# Patient Record
Sex: FEMALE | Race: WHITE | NOT HISPANIC OR LATINO | Employment: FULL TIME | URBAN - METROPOLITAN AREA
[De-identification: names, ages, dates, MRNs, and addresses within clinical notes are randomized per-mention and may not be internally consistent; named-entity substitution may affect disease eponyms.]

---

## 2021-07-19 ENCOUNTER — TELEPHONE (OUTPATIENT)
Dept: SCHEDULING | Facility: IMAGING CENTER | Age: 31
End: 2021-07-19

## 2021-07-22 ENCOUNTER — OFFICE VISIT (OUTPATIENT)
Dept: MEDICAL GROUP | Facility: PHYSICIAN GROUP | Age: 31
End: 2021-07-22
Payer: COMMERCIAL

## 2021-07-22 VITALS
SYSTOLIC BLOOD PRESSURE: 100 MMHG | TEMPERATURE: 97.1 F | DIASTOLIC BLOOD PRESSURE: 68 MMHG | BODY MASS INDEX: 25.04 KG/M2 | HEART RATE: 75 BPM | RESPIRATION RATE: 12 BRPM | WEIGHT: 124.2 LBS | HEIGHT: 59 IN | OXYGEN SATURATION: 97 %

## 2021-07-22 DIAGNOSIS — E01.0 THYROMEGALY: ICD-10-CM

## 2021-07-22 DIAGNOSIS — J45.40 MODERATE PERSISTENT ASTHMA WITHOUT COMPLICATION: ICD-10-CM

## 2021-07-22 DIAGNOSIS — Z00.00 PHYSICAL EXAM, ANNUAL: ICD-10-CM

## 2021-07-22 PROCEDURE — 99204 OFFICE O/P NEW MOD 45 MIN: CPT | Performed by: PHYSICIAN ASSISTANT

## 2021-07-22 RX ORDER — ALBUTEROL SULFATE 90 UG/1
2 AEROSOL, METERED RESPIRATORY (INHALATION) EVERY 6 HOURS PRN
Qty: 1 EACH | Refills: 0 | Status: SHIPPED | OUTPATIENT
Start: 2021-07-22 | End: 2021-08-21

## 2021-07-22 RX ORDER — DEXAMETHASONE 4 MG/1
1 TABLET ORAL 2 TIMES DAILY
Qty: 1 EACH | Refills: 0 | Status: SHIPPED | OUTPATIENT
Start: 2021-07-22 | End: 2021-08-21

## 2021-07-22 ASSESSMENT — PATIENT HEALTH QUESTIONNAIRE - PHQ9: CLINICAL INTERPRETATION OF PHQ2 SCORE: 0

## 2021-07-30 ENCOUNTER — HOSPITAL ENCOUNTER (OUTPATIENT)
Dept: LAB | Facility: MEDICAL CENTER | Age: 31
End: 2021-07-30
Attending: PHYSICIAN ASSISTANT
Payer: COMMERCIAL

## 2021-07-30 DIAGNOSIS — Z00.00 PHYSICAL EXAM, ANNUAL: ICD-10-CM

## 2021-07-30 LAB
ALBUMIN SERPL BCP-MCNC: 4.9 G/DL (ref 3.2–4.9)
ALBUMIN/GLOB SERPL: 1.8 G/DL
ALP SERPL-CCNC: 47 U/L (ref 30–99)
ALT SERPL-CCNC: 13 U/L (ref 2–50)
ANION GAP SERPL CALC-SCNC: 10 MMOL/L (ref 7–16)
AST SERPL-CCNC: 17 U/L (ref 12–45)
BASOPHILS # BLD AUTO: 0.9 % (ref 0–1.8)
BASOPHILS # BLD: 0.06 K/UL (ref 0–0.12)
BILIRUB SERPL-MCNC: 0.4 MG/DL (ref 0.1–1.5)
BUN SERPL-MCNC: 14 MG/DL (ref 8–22)
CALCIUM SERPL-MCNC: 9.3 MG/DL (ref 8.5–10.5)
CHLORIDE SERPL-SCNC: 102 MMOL/L (ref 96–112)
CHOLEST SERPL-MCNC: 171 MG/DL (ref 100–199)
CO2 SERPL-SCNC: 24 MMOL/L (ref 20–33)
CREAT SERPL-MCNC: 0.65 MG/DL (ref 0.5–1.4)
EOSINOPHIL # BLD AUTO: 0.15 K/UL (ref 0–0.51)
EOSINOPHIL NFR BLD: 2.2 % (ref 0–6.9)
ERYTHROCYTE [DISTWIDTH] IN BLOOD BY AUTOMATED COUNT: 39.5 FL (ref 35.9–50)
EST. AVERAGE GLUCOSE BLD GHB EST-MCNC: 108 MG/DL
FASTING STATUS PATIENT QL REPORTED: NORMAL
GLOBULIN SER CALC-MCNC: 2.8 G/DL (ref 1.9–3.5)
GLUCOSE SERPL-MCNC: 87 MG/DL (ref 65–99)
HBA1C MFR BLD: 5.4 % (ref 4–5.6)
HCT VFR BLD AUTO: 42.6 % (ref 37–47)
HDLC SERPL-MCNC: 48 MG/DL
HGB BLD-MCNC: 14 G/DL (ref 12–16)
IMM GRANULOCYTES # BLD AUTO: 0.02 K/UL (ref 0–0.11)
IMM GRANULOCYTES NFR BLD AUTO: 0.3 % (ref 0–0.9)
LDLC SERPL CALC-MCNC: 106 MG/DL
LYMPHOCYTES # BLD AUTO: 3.06 K/UL (ref 1–4.8)
LYMPHOCYTES NFR BLD: 44 % (ref 22–41)
MCH RBC QN AUTO: 29.3 PG (ref 27–33)
MCHC RBC AUTO-ENTMCNC: 32.9 G/DL (ref 33.6–35)
MCV RBC AUTO: 89.1 FL (ref 81.4–97.8)
MONOCYTES # BLD AUTO: 0.3 K/UL (ref 0–0.85)
MONOCYTES NFR BLD AUTO: 4.3 % (ref 0–13.4)
NEUTROPHILS # BLD AUTO: 3.36 K/UL (ref 2–7.15)
NEUTROPHILS NFR BLD: 48.3 % (ref 44–72)
NRBC # BLD AUTO: 0 K/UL
NRBC BLD-RTO: 0 /100 WBC
PLATELET # BLD AUTO: 228 K/UL (ref 164–446)
PMV BLD AUTO: 10 FL (ref 9–12.9)
POTASSIUM SERPL-SCNC: 3.9 MMOL/L (ref 3.6–5.5)
PROT SERPL-MCNC: 7.7 G/DL (ref 6–8.2)
RBC # BLD AUTO: 4.78 M/UL (ref 4.2–5.4)
SODIUM SERPL-SCNC: 136 MMOL/L (ref 135–145)
TRIGL SERPL-MCNC: 85 MG/DL (ref 0–149)
TSH SERPL DL<=0.005 MIU/L-ACNC: 1.24 UIU/ML (ref 0.38–5.33)
WBC # BLD AUTO: 7 K/UL (ref 4.8–10.8)

## 2021-07-30 PROCEDURE — 80061 LIPID PANEL: CPT

## 2021-07-30 PROCEDURE — 83036 HEMOGLOBIN GLYCOSYLATED A1C: CPT

## 2021-07-30 PROCEDURE — 36415 COLL VENOUS BLD VENIPUNCTURE: CPT

## 2021-07-30 PROCEDURE — 80053 COMPREHEN METABOLIC PANEL: CPT

## 2021-07-30 PROCEDURE — 84443 ASSAY THYROID STIM HORMONE: CPT

## 2021-07-30 PROCEDURE — 85025 COMPLETE CBC W/AUTO DIFF WBC: CPT

## 2021-08-02 ENCOUNTER — APPOINTMENT (OUTPATIENT)
Dept: URGENT CARE | Facility: CLINIC | Age: 31
End: 2021-08-02
Payer: COMMERCIAL

## 2021-08-02 ENCOUNTER — OFFICE VISIT (OUTPATIENT)
Dept: MEDICAL GROUP | Facility: PHYSICIAN GROUP | Age: 31
End: 2021-08-02
Payer: COMMERCIAL

## 2021-08-02 ENCOUNTER — APPOINTMENT (OUTPATIENT)
Dept: RADIOLOGY | Facility: IMAGING CENTER | Age: 31
End: 2021-08-02
Attending: PHYSICIAN ASSISTANT
Payer: COMMERCIAL

## 2021-08-02 VITALS
RESPIRATION RATE: 12 BRPM | TEMPERATURE: 97.8 F | SYSTOLIC BLOOD PRESSURE: 110 MMHG | DIASTOLIC BLOOD PRESSURE: 78 MMHG | HEART RATE: 100 BPM | BODY MASS INDEX: 26.33 KG/M2 | HEIGHT: 59 IN | WEIGHT: 130.6 LBS | OXYGEN SATURATION: 98 %

## 2021-08-02 DIAGNOSIS — M54.2 NECK PAIN, ACUTE: ICD-10-CM

## 2021-08-02 PROCEDURE — 99213 OFFICE O/P EST LOW 20 MIN: CPT | Performed by: PHYSICIAN ASSISTANT

## 2021-08-02 PROCEDURE — 72040 X-RAY EXAM NECK SPINE 2-3 VW: CPT | Mod: TC | Performed by: PHYSICIAN ASSISTANT

## 2021-08-02 RX ORDER — METHOCARBAMOL 750 MG/1
750 TABLET, FILM COATED ORAL 4 TIMES DAILY
Qty: 40 TABLET | Refills: 0 | Status: SHIPPED | OUTPATIENT
Start: 2021-08-02 | End: 2021-08-12

## 2021-08-02 ASSESSMENT — FIBROSIS 4 INDEX: FIB4 SCORE: 0.64

## 2021-08-02 NOTE — PROGRESS NOTES
CC:  Chief Complaint   Patient presents with   • Neck Pain     injured 3 yrs ago, cant move neck        HISTORY OF PRESENT ILLNESS: Patient is a 31 y.o. female established patient presenting with pain in her neck that started 3 days ago when she was doing push ups. Has hx of injury to her neck 3 years ago when she was doing sit ups with fingers interlocked behind her head. Did not have imaging done. Was prescribed tylenol. Since then she has had repeated neck injuries that have been quite painful. Injuries occurring when she is doing an exercise with her [employer] unit. States that they are occurring similar to today about once per month and pain eventually subsides over the course of a week. Pain located midline over her C5-C6 region and extends up her neck.       No problem-specific Assessment & Plan notes found for this encounter.      There are no problems to display for this patient.     Allergies:Patient has no known allergies.    Current Outpatient Medications   Medication Sig Dispense Refill   • fluticasone (FLOVENT HFA) 110 MCG/ACT Aerosol Inhale 1 Puff 2 times a day for 30 days. 1 Each 0   • albuterol 108 (90 Base) MCG/ACT Aero Soln inhalation aerosol Inhale 2 Puffs every 6 hours as needed for Shortness of Breath for up to 30 days. 1 Each 0     No current facility-administered medications for this visit.       Social History     Tobacco Use   • Smoking status: Never Smoker   • Smokeless tobacco: Never Used   Vaping Use   • Vaping Use: Never used   Substance Use Topics   • Alcohol use: Yes     Comment: rarely   • Drug use: Never     Social History     Social History Narrative   • Not on file       Family History   Problem Relation Age of Onset   • Heart Disease Father         ROS:     - Constitutional:  Negative for fever, chills, unexpected weight change, and fatigue/generalized weakness.    - HEENT: Positive for neck pain.  Negative for headaches, vision changes, hearing changes, ear pain, ear discharge,  "rhinorrhea, sinus congestion, sore throat,     - Musculoskeletal:Positive for neck pain.  Negative for myalgias,  and joint pain.     - Skin: Negative for rash, itching, cyanotic skin color change.     - Neurological: Negative for dizziness, tingling, tremors, focal sensory deficit, focal weakness and headaches.         Exam:    /78   Pulse 100   Temp 36.6 °C (97.8 °F) (Temporal)   Resp 12   Ht 1.499 m (4' 11\")   Wt 59.2 kg (130 lb 9.6 oz)   SpO2 98%  Body mass index is 26.38 kg/m².    General:  Appears uncomfortable  Head is grossly normal.  Neck: Very reduced cervical ROM with flexion, extension, and rotation. Thyroid is not enlarged.  Skin: Warm and dry. No obvious lesions  Neuro: Normal muscle tone. Gait normal. Alert and oriented.  Psych: Normal mood and affect      Please note that this dictation was created using voice recognition software. I have made every reasonable attempt to correct obvious errors, but I expect that there are errors of grammar and possibly content that I did not discover before finalizing the note.        Assessment/Plan:  1. Neck pain, acute  I explained to her that her repeated neck injuries will likely cause permanent damage over time. She is to stop doing any physical activities that aggrevates her neck. I am going to get xray and MRI and will review results with her when available.   - DX-CERVICAL SPINE-2 OR 3 VIEWS; Future  - MR-CERVICAL SPINE-W/O; Future  - methocarbamol (ROBAXIN-750) 750 MG Tab; Take 1 tablet by mouth 4 times a day for 10 days.  Dispense: 40 tablet; Refill: 0           "

## 2021-08-19 ENCOUNTER — HOSPITAL ENCOUNTER (OUTPATIENT)
Dept: RADIOLOGY | Facility: MEDICAL CENTER | Age: 31
End: 2021-08-19
Attending: PHYSICIAN ASSISTANT
Payer: COMMERCIAL

## 2021-08-19 DIAGNOSIS — E01.0 THYROMEGALY: ICD-10-CM

## 2021-08-19 PROCEDURE — 76536 US EXAM OF HEAD AND NECK: CPT

## 2022-03-28 ENCOUNTER — OFFICE VISIT (OUTPATIENT)
Dept: URGENT CARE | Facility: CLINIC | Age: 32
End: 2022-03-28
Payer: COMMERCIAL

## 2022-03-28 ENCOUNTER — APPOINTMENT (OUTPATIENT)
Dept: RADIOLOGY | Facility: IMAGING CENTER | Age: 32
End: 2022-03-28
Attending: PHYSICIAN ASSISTANT
Payer: COMMERCIAL

## 2022-03-28 VITALS
BODY MASS INDEX: 26.35 KG/M2 | HEIGHT: 59 IN | DIASTOLIC BLOOD PRESSURE: 64 MMHG | TEMPERATURE: 97.9 F | RESPIRATION RATE: 12 BRPM | OXYGEN SATURATION: 98 % | WEIGHT: 130.7 LBS | HEART RATE: 74 BPM | SYSTOLIC BLOOD PRESSURE: 102 MMHG

## 2022-03-28 DIAGNOSIS — M79.671 RIGHT FOOT PAIN: ICD-10-CM

## 2022-03-28 DIAGNOSIS — S96.911A STRAIN OF RIGHT FOOT, INITIAL ENCOUNTER: ICD-10-CM

## 2022-03-28 PROCEDURE — 99213 OFFICE O/P EST LOW 20 MIN: CPT | Performed by: PHYSICIAN ASSISTANT

## 2022-03-28 PROCEDURE — 73630 X-RAY EXAM OF FOOT: CPT | Mod: TC,RT | Performed by: RADIOLOGY

## 2022-03-28 ASSESSMENT — FIBROSIS 4 INDEX: FIB4 SCORE: 0.66

## 2022-03-28 ASSESSMENT — ENCOUNTER SYMPTOMS
SENSORY CHANGE: 0
NAUSEA: 0
VOMITING: 0
CHILLS: 0
TINGLING: 0
FEVER: 0

## 2022-03-28 NOTE — LETTER
March 28, 2022         Patient: Payton Tang   YOB: 1990   Date of Visit: 3/28/2022           To Whom it May Concern:    Payton Tang was seen in my clinic on 3/28/2022.  Please excuse her from any strenuous physical activity, specifically running, until she follows up with orthopedics.    If you have any questions or concerns, please don't hesitate to call.        Sincerely,           Lynette Hernandez P.A.-C.  Electronically Signed

## 2022-03-28 NOTE — PROGRESS NOTES
"Subjective     Payton Tang is a 32 y.o. female who presents with Foot Pain ((R) heel sharp pain x 3/24; working out ( climbing ladders) when pain occurred)    HPI:  Payton Tang is a 32 y.o. female who presents today for evaluation of right foot pain.  Patient reports that she started to feel pain on the lateral aspect of her right foot/heel approximately 4 days ago.  She first noticed it while she was climbing ladders but denies any specific injury.  Has never hurt that foot before.  Has sprained the ankle in the past.  She has been applying heat to the area and taking ibuprofen which is providing almost no relief.  She denies any numbness or tingling.      Review of Systems   Constitutional: Negative for chills and fever.   Gastrointestinal: Negative for nausea and vomiting.   Musculoskeletal:        Right foot pain   Neurological: Negative for tingling and sensory change.         PMH:  has a past medical history of Allergy and Asthma.  MEDS: No current outpatient medications on file.  ALLERGIES:   Allergies   Allergen Reactions   • Seasonal      SURGHX:   Past Surgical History:   Procedure Laterality Date   • PRIMARY C SECTION      2     SOCHX:  reports that she has never smoked. She has never used smokeless tobacco. She reports current alcohol use. She reports that she does not use drugs.  FH: Family history was reviewed, no pertinent findings to report      Objective     /64 (BP Location: Right arm, Patient Position: Sitting)   Pulse 74   Temp 36.6 °C (97.9 °F) (Temporal)   Resp 12   Ht 1.499 m (4' 11\")   Wt 59.3 kg (130 lb 11.2 oz)   SpO2 98%   BMI 26.40 kg/m²      Physical Exam  Constitutional:       Appearance: She is well-developed.   HENT:      Head: Normocephalic and atraumatic.      Right Ear: External ear normal.      Left Ear: External ear normal.   Eyes:      Conjunctiva/sclera: Conjunctivae normal.      Pupils: Pupils are equal, round, and reactive to light. "   Cardiovascular:      Rate and Rhythm: Normal rate.   Pulmonary:      Effort: Pulmonary effort is normal.   Musculoskeletal:      Right foot: Normal range of motion. Swelling, tenderness and bony tenderness present.      Comments: Right foot exhibits tenderness over base of the fifth metatarsal and mild surrounding tenderness.  There is some localized soft tissue swelling in that area.  No overlying erythema or ecchymosis.  DP pulses are 2+ and symmetric bilaterally.  Distal neurovascular status intact.  Cap refill of all toes is less than 2 seconds.  No tenderness at the ankle.  No decreased range of motion of the foot or ankle.  Mildly antalgic gait.   Skin:     General: Skin is warm and dry.      Capillary Refill: Capillary refill takes less than 2 seconds.   Neurological:      Mental Status: She is alert and oriented to person, place, and time.   Psychiatric:         Behavior: Behavior normal.         Judgment: Judgment normal.          DX-FOOT-COMPLETE 3+ RIGHT  FINDINGS:  No focal soft tissue swelling.  No fracture or dislocation.  No retained opaque foreign body.     IMPRESSION:  Unremarkable RIGHT foot.      Assessment & Plan     1. Strain of right foot, initial encounter  - Referral to Orthopedics    2. Right foot pain  - DX-FOOT-COMPLETE 3+ RIGHT; Future  - Referral to Orthopedics        No evidence of acute fracture or dislocation.  There is some soft tissue swelling noted on exam as well as some point tenderness over the fifth metatarsal.  Cannot definitively rule out ligamentous injury or stress fracture.  Patient was placed in a walking boot was recommended that she try to limit her weightbearing activities.  She should take Aleve twice daily and recommend that she apply ice to the area multiple times per day.  Follow-up with orthopedics for more definitive management.            Differential Diagnosis, natural history, and supportive care discussed. Return to the Urgent Care or follow up with your  PCP if symptoms fail to resolve, or for any new or worsening symptoms. Emergency room precautions discussed. Patient and/or family appears understanding of information.

## 2023-06-15 ENCOUNTER — OFFICE VISIT (OUTPATIENT)
Dept: URGENT CARE | Facility: PHYSICIAN GROUP | Age: 33
End: 2023-06-15
Payer: OTHER GOVERNMENT

## 2023-06-15 ENCOUNTER — HOSPITAL ENCOUNTER (OUTPATIENT)
Dept: RADIOLOGY | Facility: MEDICAL CENTER | Age: 33
End: 2023-06-15
Attending: STUDENT IN AN ORGANIZED HEALTH CARE EDUCATION/TRAINING PROGRAM
Payer: OTHER GOVERNMENT

## 2023-06-15 VITALS
DIASTOLIC BLOOD PRESSURE: 76 MMHG | OXYGEN SATURATION: 98 % | HEIGHT: 59 IN | RESPIRATION RATE: 16 BRPM | BODY MASS INDEX: 26.21 KG/M2 | SYSTOLIC BLOOD PRESSURE: 118 MMHG | WEIGHT: 130 LBS | HEART RATE: 88 BPM | TEMPERATURE: 97.7 F

## 2023-06-15 DIAGNOSIS — S49.91XA INJURY OF RIGHT SHOULDER, INITIAL ENCOUNTER: ICD-10-CM

## 2023-06-15 DIAGNOSIS — S46.001A ROTATOR CUFF INJURY, RIGHT, INITIAL ENCOUNTER: ICD-10-CM

## 2023-06-15 PROCEDURE — 3078F DIAST BP <80 MM HG: CPT | Performed by: STUDENT IN AN ORGANIZED HEALTH CARE EDUCATION/TRAINING PROGRAM

## 2023-06-15 PROCEDURE — 3074F SYST BP LT 130 MM HG: CPT | Performed by: STUDENT IN AN ORGANIZED HEALTH CARE EDUCATION/TRAINING PROGRAM

## 2023-06-15 PROCEDURE — 73030 X-RAY EXAM OF SHOULDER: CPT | Mod: RT

## 2023-06-15 PROCEDURE — 99214 OFFICE O/P EST MOD 30 MIN: CPT | Performed by: STUDENT IN AN ORGANIZED HEALTH CARE EDUCATION/TRAINING PROGRAM

## 2023-06-15 RX ORDER — ALBUTEROL SULFATE 90 UG/1
AEROSOL, METERED RESPIRATORY (INHALATION)
COMMUNITY
Start: 2023-04-04

## 2023-06-15 ASSESSMENT — FIBROSIS 4 INDEX: FIB4 SCORE: 0.68

## 2023-06-16 NOTE — PROGRESS NOTES
"Subjective:   Payton Tang is a 33 y.o. female who presents for Shoulder Injury (Right ,lifting and moving equipment @1:00 today  )      HPI:  Pleasant 33-year-old female who is active [employer] presents to the urgent care today after sustaining an injury to her right shoulder at approximately 1 PM today.  She states that she was lifting heavy objects and operating a crank with her right arm when she started experiencing pain in her shoulder.  States that the pain is mainly located to the front of her shoulder.  She does report reduced range of motion due to pain.  She denies any past trauma to the right shoulder.  No history of surgeries.  Denies numbness, tingling, burning, radiation of pain down the arm, falls, loss of consciousness, neck pain, fever, chills, shortness of breath, or chest pain.    Medications:    albuterol Aers    Allergies: Seasonal    Problem List: Payton Tang does not have a problem list on file.    Surgical History:  Past Surgical History:   Procedure Laterality Date    PRIMARY C SECTION      2       Past Social Hx: Payton Tang  reports that she has never smoked. She has never used smokeless tobacco. She reports current alcohol use. She reports that she does not use drugs.     Past Family Hx:  Payton Tang family history includes Heart Disease in her father.     Problem list, medications, and allergies reviewed by myself today in Epic.     Objective:     /76   Pulse 88   Temp 36.5 °C (97.7 °F) (Temporal)   Resp 16   Ht 1.499 m (4' 11\")   Wt 59 kg (130 lb)   SpO2 98%   BMI 26.26 kg/m²     Physical Exam  Vitals reviewed.   Constitutional:       General: She is not in acute distress.     Appearance: Normal appearance.   HENT:      Head: Normocephalic.   Cardiovascular:      Rate and Rhythm: Normal rate.      Pulses: Normal pulses.   Pulmonary:      Effort: Pulmonary effort is normal.   Musculoskeletal:      Cervical back: Normal range of motion. " No tenderness.      Comments: Right shoulder: No swelling, deformity, erythema, ecchymosis, or crepitus.  Tenderness present to the anterior shoulder and over the AC joint.  Reduced range of motion with forward shoulder flexion to approximately 80 degrees.  Unable to perform cross body test.  Unable to perform empty can test due to limited forward shoulder flexion.  4-5 strength during external rotation and forward shoulder flexion.  Sensation intact and cap refill less than 2 seconds.  2+ radial pulse.  Limited passive range of motion due to pain.   Neurological:      Mental Status: She is alert.         RADIOLOGY RESULTS   DX-SHOULDER 2+ RIGHT    Result Date: 6/15/2023  6/15/2023 4:23 PM HISTORY/REASON FOR EXAM:  Pain following trauma. TECHNIQUE/EXAM DESCRIPTION AND NUMBER OF VIEWS:  3 views of the RIGHT shoulder. COMPARISON: None FINDINGS: BONE MINERALIZATION: Normal. JOINTS: Preserved. No erosions. FRACTURE: None. DISLOCATION: None. SOFT TISSUES: No mass.     No acute osseous abnormality.           Assessment/Plan:     Diagnosis and associated orders:     1. Rotator cuff injury, right, initial encounter  DX-SHOULDER 2+ RIGHT    Referral to Sports Medicine         Comments/MDM:     X-ray shows no acute osseous abnormality.  I do suspect right rotator cuff strain versus tear.  Advised ibuprofen 600 mg every 6 hours.  May also supplement with 1 g Tylenol.  Ice 3-5 times daily for 20 minutes at a time.  Discussed early range of motion and stretching as tolerated.  Discussed frozen shoulder and ways to avoid this.  She was given a sling but only advised to use this for very short periods of time throughout the day as this can increase the chance of frozen shoulder.  Referral to sports medicine for further evaluation and management.  Patient is agreeable this.  She is neurovascular intact.  Vitals are stable.  ED/return precautions given.  Patient brought in  paperwork that was filled out and returned to her  at the end of her visit.         Differential diagnosis, natural history, supportive care, and indications for immediate follow-up discussed.    Advised the patient to follow-up with the primary care physician for recheck, reevaluation, and consideration of further management.    Please note that this dictation was created using voice recognition software. I have made a reasonable attempt to correct obvious errors, but I expect that there are errors of grammar and possibly content that I did not discover before finalizing the note.    Electronically signed by Jayy Clemente PA-C.

## 2023-06-30 ENCOUNTER — OFFICE VISIT (OUTPATIENT)
Dept: SPORTS MEDICINE | Facility: CLINIC | Age: 33
End: 2023-06-30
Payer: OTHER GOVERNMENT

## 2023-06-30 VITALS
BODY MASS INDEX: 26.21 KG/M2 | HEIGHT: 59 IN | TEMPERATURE: 98.6 F | RESPIRATION RATE: 16 BRPM | WEIGHT: 130 LBS | DIASTOLIC BLOOD PRESSURE: 70 MMHG | HEART RATE: 80 BPM | OXYGEN SATURATION: 97 % | SYSTOLIC BLOOD PRESSURE: 116 MMHG

## 2023-06-30 DIAGNOSIS — R29.898 WEAKNESS OF SHOULDER: ICD-10-CM

## 2023-06-30 DIAGNOSIS — S46.011A TRAUMATIC INCOMPLETE TEAR OF RIGHT ROTATOR CUFF, INITIAL ENCOUNTER: ICD-10-CM

## 2023-06-30 PROCEDURE — 76882 US LMTD JT/FCL EVL NVASC XTR: CPT | Performed by: FAMILY MEDICINE

## 2023-06-30 PROCEDURE — 3078F DIAST BP <80 MM HG: CPT | Performed by: FAMILY MEDICINE

## 2023-06-30 PROCEDURE — 99213 OFFICE O/P EST LOW 20 MIN: CPT | Performed by: FAMILY MEDICINE

## 2023-06-30 PROCEDURE — 3074F SYST BP LT 130 MM HG: CPT | Performed by: FAMILY MEDICINE

## 2023-06-30 ASSESSMENT — FIBROSIS 4 INDEX: FIB4 SCORE: 0.68

## 2023-06-30 NOTE — Clinical Note
Emmett Hope, Thank you for referring Maureen to the sports medicine clinic. Interesting case.  She has pretty significant weakness of the rotator cuff.  I suspect she may have a partial tear of the supraspinatus.  I did order an MRI and we will plan on seeing her back afterwards to discuss further management options. Hope you are well! Respectfully,  HALLIE Carreno M.D. [facility] [place] [phone]

## 2023-06-30 NOTE — PROGRESS NOTES
"Chief Complaint   Patient presents with    Shoulder Pain     Referral from UC/ R shoulder pain      CHIEF COMPLAINT:  Payton Tang female presenting at the request of Jayy Clemente P.A.-C.   for evaluation of Shoulder pain.     Payton Tang is complaining of right shoulder pain  Date of injury, Megan 15, 2023  Mechanism, she states that she was lifting heavy objects and operating a crank with her right arm when she started experiencing pain in her shoulder while performing [employer] duty  Felt sharp pain immediately after she had been cranking all morning  Pain is at the anterior and deltoid region  Quality is aching, sharp,  worse with lifting, pain tends to be anterior, arm/shoulder feels \"tired\"  Pain is Non-radiating  Aggravated by movement, particularly with abduction  Improved with  rest, but sling immobilization actually made her symptoms WORSE  no prior problems with this shoulder in the past  Prior Treatments:  Seen in urgent care  Prior studies: X-Ray   Medications tried for pain include: acetaminophen helps a bit temporarily  Mechanical Symptom history: No Locking and Popping which is new since the injury and can feel aggravating and discomforting    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 No pertinent past medical history.    PMH:  has a past medical history of Allergy and Asthma.  MEDS:   Current Outpatient Medications:     albuterol 108 (90 Base) MCG/ACT Aero Soln inhalation aerosol, INHALE 1 PUFF BY MOUTH EVERY 6 HOURS AS NEEDED FOR WHEEZING., Disp: , Rfl:   ALLERGIES:   Allergies   Allergen Reactions    Seasonal      SURGHX:   Past Surgical History:   Procedure Laterality Date    PRIMARY C SECTION      2     SOCHX:  reports that she has never smoked. She has never used smokeless tobacco. " "She reports current alcohol use. She reports that she does not use drugs.  FH: Family history was reviewed, no pertinent findings to report     PHYSICAL EXAM:  /70 (BP Location: Left arm, Patient Position: Sitting, BP Cuff Size: Adult)   Pulse 80   Temp 37 °C (98.6 °F) (Temporal)   Resp 16   Ht 1.499 m (4' 11\")   Wt 59 kg (130 lb)   SpO2 97%   BMI 26.26 kg/m²      well-developed, well-nourished in no apparent distress, alert and oriented x 3.  Gait: normal    Cervical spine:  Range of motion Slightly limited with Extension and Slightly limited with Lateral rotation particularly to the RIGHT  Spurling's testing is POSITIVE with pain radiating into the shoulder and akua-scapular region on the RIGHT  Cervical spine tenderness POSITIVE at the level of C5    Strength testing:     Deltoid, bilateral 5/5  Bicep, bilateral 5/5  Tricep, bilateral 5/5  Wrist Extension, bilateral 5/5  Wrist Flexion, bilateral 5/5  Finger Abduction, bilateral 5/5    Sensation:  INTACT Bilaterally    Reflexes:   Biceps: R 2+/L 2+  Triceps: R 2+/L  2+  Brachial radialis R 2+/L  2+  Crook's testing is NEGATIVE  The arms are otherwise neurovascularly intact     Shoulder Exam:    RIGHT Shoulder:  No visible swelling   Range of motion MARKEDLY DIMINISHED with pain, particularly with abduction  Tenderness: Supraspinatous tenderness  Empty Can Testing 0/5 with trouble lifting against gravity  Internal Rotation 5/5  External Rotation 3/5  Lift Off Testing 4/5  Impingement testing Duncan  POSITIVE  Neer's testing POSITIVE  Apprehension testing POSITIVE    LEFT Shoulder:  No visible swelling   Range of motion INTACT  Tenderness: Non-tender  Empty Can Testing 5/5  Internal Rotation 5/5  External Rotation 5/5  Lift Off Testing 5/5  Impingement testing Duncan  NEGATIVE  Neer's testing NEGATIVE  Apprehension testing NEGATIVE    Additional Findings: Flexed Posture    1. Traumatic incomplete tear of right rotator cuff, initial encounter  " MR-SHOULDER-W/O RIGHT      2. Weakness of shoulder (supraspinatus)  MR-SHOULDER-W/O RIGHT        Date of injury, Megan 15, 2023  Mechanism, she states that she was lifting heavy objects and operating a crank with her right arm when she started experiencing pain in her shoulder while performing [employer] duty  Felt sharp pain immediately after she had been cranking all morning    Given her weakness, limited musculoskeletal ultrasound evaluation of the RIGHT shoulder was performed the office TODAY (June 30, 2023)  There is a hypoechoic region within the supraspinatus articular side which seems to be consistent with a partial tear of the rotator cuff    Follow-up after MRI to discuss results and further management options        Supraspinatus demonstrates hypoechoic region at the articular surface of the insertion point  Initially, suspected this might be anisotropy, but various angles demonstrated continued hypoechoic signal        6/15/2023 4:23 PM     HISTORY/REASON FOR EXAM:  Pain following trauma.     TECHNIQUE/EXAM DESCRIPTION AND NUMBER OF VIEWS:  3 views of the RIGHT shoulder.     COMPARISON: None     FINDINGS:     BONE MINERALIZATION: Normal.  JOINTS: Preserved. No erosions.  FRACTURE: None.  DISLOCATION: None.  SOFT TISSUES: No mass.     IMPRESSION:     No acute osseous abnormality.           Exam Ended: 06/15/23  4:43 PM Last Resulted: 06/15/23  4:59 PM           Interpreted in the office today with the patient    Thank you Jayy Clemente P.A.-C.  For allowing me to participate in caring for your patient.

## 2023-06-30 NOTE — PROCEDURES
Procedures    RIGHT shoulder LIMITED musculoskeletal ultrasound evaluation  There is a hypoechoic region noted within the supraspinatus at the articular surface consistent with partial rotator cuff tear

## 2023-06-30 NOTE — Clinical Note
Emmett Caputo, I saw Payton in the sports medicine clinic today for shoulder pain.  She had been doing a lot of heavy labor putting up equipment while on [employer] duty.  She had been cranking all morning and experienced pain in the afternoon. She has pretty significant weakness of the shoulder and cannot abduct the shoulder beyond 90 degrees suggesting a rotator cuff tear (particularly supraspinatus).  We performed an ultrasound in the office which confirms abnormal signal at the supraspinatus.  I ordered an MRI, but since she has , it may need to come from her PCP. We may need your assistance if the order is rejected. Thanks in advance! Respectfully,  HALLIE Carreno M.D. [facility] [place] [phone]

## 2023-07-13 ENCOUNTER — APPOINTMENT (OUTPATIENT)
Dept: RADIOLOGY | Facility: MEDICAL CENTER | Age: 33
End: 2023-07-13
Attending: FAMILY MEDICINE
Payer: OTHER GOVERNMENT

## 2023-07-13 DIAGNOSIS — R29.898 WEAKNESS OF SHOULDER: ICD-10-CM

## 2023-07-13 DIAGNOSIS — S46.011A TRAUMATIC INCOMPLETE TEAR OF RIGHT ROTATOR CUFF, INITIAL ENCOUNTER: ICD-10-CM

## 2023-07-13 PROCEDURE — 73221 MRI JOINT UPR EXTREM W/O DYE: CPT | Mod: RT

## 2023-07-14 ENCOUNTER — OFFICE VISIT (OUTPATIENT)
Dept: SPORTS MEDICINE | Facility: CLINIC | Age: 33
End: 2023-07-14
Payer: OTHER GOVERNMENT

## 2023-07-14 VITALS — HEIGHT: 59 IN | WEIGHT: 130 LBS | BODY MASS INDEX: 26.21 KG/M2

## 2023-07-14 DIAGNOSIS — S46.911D RIGHT SHOULDER STRAIN, SUBSEQUENT ENCOUNTER: ICD-10-CM

## 2023-07-14 DIAGNOSIS — R29.898 SHOULDER WEAKNESS: ICD-10-CM

## 2023-07-14 PROCEDURE — 99213 OFFICE O/P EST LOW 20 MIN: CPT | Performed by: FAMILY MEDICINE

## 2023-07-14 ASSESSMENT — FIBROSIS 4 INDEX: FIB4 SCORE: 0.68

## 2023-07-14 NOTE — PROGRESS NOTES
Chief Complaint   Patient presents with    Shoulder Injury     F/V R shoulder injury       Pain persists  Here for MRI results    1. Right shoulder strain, subsequent encounter  Referral to Physical Therapy    Referral to Physical Therapy      2. Shoulder weakness  Referral to Physical Therapy    Referral to Physical Therapy        Referred for formal physical therapy in [place]    We will see how she does with home exercise program and physical therapy    Return in about 2 weeks (around 7/28/2023).  If she does not respond or symptoms continue over the next few weeks, consider subacromial corticosteroid injection of the RIGHT shoulder                7/13/2023 6:28 PM     HISTORY/REASON FOR EXAM:  Right-sided shoulder pain.     TECHNIQUE/EXAM DESCRIPTION:  MRI of the RIGHT shoulder without contrast.     Using a Siemens Skyra 3.0 Hina MRI scanner, T1 sagittal, fast spin-echo T2 fat-suppressed oblique coronal, sagittal, and axial and intermediate fast spin-echo oblique coronal images were obtained.     COMPARISON: None.     FINDINGS:     Osseous acromial outlet: The acromion demonstrates a curved undersurface. There is mild lateral downsloping.  There is no evidence of an inferiorly directed subacromial enthesophyte.  There is no evidence of degenerative change of the acromioclavicular joint. There are no inferiorly directed osteophytes.  There is no fluid seen within the subacromial/subdeltoid bursa.     Rotator cuff: Intact.  No evidence of tear.     Glenoid labrum: The superior glenoid labrum is intact. The anterior/inferior and posterior/inferior glenoid labrum are intact.     Osseous structures/Cartilaginous surfaces: No evidence of marrow edema.  Cartilaginous surfaces are well maintained.        Miscellaneous: No significant joint effusion. The long head of the biceps tendon is appropriately situated within the bicipital groove.     IMPRESSION:     1.  Intact rotator cuff.     2.  Intact glenoid labrum.     3.   No evidence of osseous or cartilaginous injury.           Exam Ended: 07/13/23  6:50 PM Last Resulted: 07/14/23  7:53 AM           Interpreted in the office today with the patient

## 2023-08-04 ENCOUNTER — OFFICE VISIT (OUTPATIENT)
Dept: MEDICAL GROUP | Facility: PHYSICIAN GROUP | Age: 33
End: 2023-08-04
Payer: OTHER GOVERNMENT

## 2023-08-04 VITALS
WEIGHT: 130 LBS | HEIGHT: 59 IN | BODY MASS INDEX: 26.21 KG/M2 | OXYGEN SATURATION: 100 % | DIASTOLIC BLOOD PRESSURE: 68 MMHG | SYSTOLIC BLOOD PRESSURE: 102 MMHG | TEMPERATURE: 98.9 F | HEART RATE: 69 BPM

## 2023-08-04 DIAGNOSIS — Z00.00 ROUTINE HEALTH MAINTENANCE: ICD-10-CM

## 2023-08-04 DIAGNOSIS — Z11.59 NEED FOR HEPATITIS C SCREENING TEST: ICD-10-CM

## 2023-08-04 DIAGNOSIS — M54.2 CHRONIC NECK PAIN: ICD-10-CM

## 2023-08-04 DIAGNOSIS — G89.29 CHRONIC NECK PAIN: ICD-10-CM

## 2023-08-04 DIAGNOSIS — E78.00 ELEVATED LDL CHOLESTEROL LEVEL: ICD-10-CM

## 2023-08-04 DIAGNOSIS — J30.2 SEASONAL ALLERGIES: ICD-10-CM

## 2023-08-04 DIAGNOSIS — Z83.49 FAMILY HISTORY OF THYROID DISEASE IN MOTHER: ICD-10-CM

## 2023-08-04 DIAGNOSIS — Z76.89 ENCOUNTER TO ESTABLISH CARE: ICD-10-CM

## 2023-08-04 DIAGNOSIS — G89.29 CHRONIC RIGHT SHOULDER PAIN: ICD-10-CM

## 2023-08-04 DIAGNOSIS — J45.990 EXERCISE-INDUCED ASTHMA: ICD-10-CM

## 2023-08-04 DIAGNOSIS — Z97.5 IUD (INTRAUTERINE DEVICE) IN PLACE: ICD-10-CM

## 2023-08-04 DIAGNOSIS — M25.511 CHRONIC RIGHT SHOULDER PAIN: ICD-10-CM

## 2023-08-04 PROCEDURE — 3074F SYST BP LT 130 MM HG: CPT | Performed by: STUDENT IN AN ORGANIZED HEALTH CARE EDUCATION/TRAINING PROGRAM

## 2023-08-04 PROCEDURE — 99214 OFFICE O/P EST MOD 30 MIN: CPT | Performed by: STUDENT IN AN ORGANIZED HEALTH CARE EDUCATION/TRAINING PROGRAM

## 2023-08-04 PROCEDURE — 3078F DIAST BP <80 MM HG: CPT | Performed by: STUDENT IN AN ORGANIZED HEALTH CARE EDUCATION/TRAINING PROGRAM

## 2023-08-04 RX ORDER — EPINEPHRINE 0.3 MG/.3ML
0.3 INJECTION SUBCUTANEOUS ONCE
COMMUNITY

## 2023-08-04 RX ORDER — FEXOFENADINE HCL 180 MG/1
180 TABLET ORAL DAILY
COMMUNITY

## 2023-08-04 RX ORDER — IPRATROPIUM BROMIDE 42 UG/1
2 SPRAY, METERED NASAL 4 TIMES DAILY
COMMUNITY

## 2023-08-04 RX ORDER — NAPROXEN 500 MG/1
500 TABLET ORAL 2 TIMES DAILY WITH MEALS
COMMUNITY

## 2023-08-04 RX ORDER — LEVOCETIRIZINE DIHYDROCHLORIDE 5 MG/1
TABLET, FILM COATED ORAL
COMMUNITY

## 2023-08-04 RX ORDER — FLUTICASONE PROPIONATE 50 MCG
1 SPRAY, SUSPENSION (ML) NASAL DAILY
COMMUNITY

## 2023-08-04 RX ORDER — METHOCARBAMOL 500 MG/1
1000 TABLET, FILM COATED ORAL 4 TIMES DAILY
COMMUNITY

## 2023-08-04 NOTE — PROGRESS NOTES
Subjective:     CC:  establish care    HISTORY OF THE PRESENT ILLNESS: Patient is a 33 y.o. female here today to establish care. Prior PCP was Harshal Tripathi.    Exercise-induced asthma  Patient uses albuterol prior to exercises but complains of SOB in the middle of doing cardio.    Seasonal allergies  Patient reports no improvement with levocetirizine, cetirizine, fexofenadine, fluticasone nasal spray, ipratropium nasal spray.  Patient occasionally uses albuterol inhaler.  Patient reports history of frequent sinus infections.  Patient is requesting referral to Allergist.    Chronic neck pain  Onset in 2017.  Patient was in training for the [employer] and onset when she was doing a sit up.  Patient described it as sharp, pinching pain.  Patient reports she can no longer do sit-up.  [facility] and naproxen but reports no improvement.  Patient tried massage therapy and orthopedic pillow but no improvement.    Chronic right shoulder pain  Patient complains of chronic right shoulder pain that was recently exacerbated.  Follows up with sports medicine and PT ordered.  X-ray and MRI were unremarkable.      Health Maintenance: Completed  - [facility]    Allergies   Allergen Reactions    Peg-8 Beeswax [Polyethylene Glycol]     Seasonal      Patient Active Problem List   Diagnosis    Chronic right shoulder pain    Seasonal allergies    Exercise-induced asthma    Chronic neck pain    IUD (intrauterine device) in place     Current Outpatient Medications   Medication Sig Dispense Refill    EPINEPHrine (EPIPEN) 0.3 MG/0.3ML Solution Auto-injector solution for injection Inject 0.3 mg into the shoulder, thigh, or buttocks one time.      methocarbamol (ROBAXIN) 500 MG Tab Take 1,000 mg by mouth 4 times a day.      naproxen (NAPROSYN) 500 MG Tab Take 500 mg by mouth 2 times a day with meals.      fluticasone (FLONASE) 50 MCG/ACT nasal spray Administer 1 Spray into  affected nostril(S) every day.      Levocetirizine Dihydrochloride 5 MG Tab Take  by mouth.      Cetirizine HCl 10 MG Cap Take  by mouth.      ipratropium (ATROVENT) 0.06 % Solution Administer 2 Sprays into affected nostril(S) 4 times a day.      fexofenadine (KP FEXOFENADINE HCL) 180 MG tablet Take 180 mg by mouth every day.      albuterol 108 (90 Base) MCG/ACT Aero Soln inhalation aerosol INHALE 1 PUFF BY MOUTH EVERY 6 HOURS AS NEEDED FOR WHEEZING.       No current facility-administered medications for this visit.     Past Surgical History:   Procedure Laterality Date    PRIMARY C SECTION      2      Social History     Socioeconomic History    Marital status:      Spouse name: Not on file    Number of children: 2    Years of education: Not on file    Highest education level: Not on file   Occupational History     Employer: [employer]   Tobacco Use    Smoking status: Never    Smokeless tobacco: Never   Vaping Use    Vaping Use: Never used   Substance and Sexual Activity    Alcohol use: Yes     Alcohol/week: 0.6 - 1.2 oz     Types: 1 - 2 Shots of liquor per week     Comment: rarely    Drug use: Never    Sexual activity: Yes     Partners: Male     Birth control/protection: I.U.D.     Comment: , partner of 1 year, iud placed 2019   Other Topics Concern    Not on file   Social History Narrative    Lives with partner of 1 year and children 50% of the time.     Social Determinants of Health     Financial Resource Strain: Not on file   Food Insecurity: Not on file   Transportation Needs: Not on file   Physical Activity: Not on file   Stress: Not on file   Social Connections: Not on file   Intimate Partner Violence: Not on file   Housing Stability: Not on file     Family History   Problem Relation Age of Onset    Diabetes Mother     Heart Disease Mother         recently had stents and needs bypass    Peripheral vascular disease Mother     Thyroid Mother     Heart Disease Father           "of MI    Kidney Disease Maternal Aunt         dialysis    Cancer Neg Hx     Ovarian Cancer Neg Hx     Tubal Cancer Neg Hx     Peritoneal Cancer Neg Hx     Breast Cancer Neg Hx     Colorectal Cancer Neg Hx          ROS:     Constitutional:  Negative for chills, fever, fatigue, weight loss.  HEENT:  Positive for sinus pressure and nasal congestion but negative for blurred vision, hearing loss, sore throat.    Respiratory:  Negative for cough, sputum production and shortness of breath.  Cardiovascular:  Negative for chest pain, palpitations and leg swelling.  Gastrointestinal:  Negative for abdominal pain, blood in stool, constipation, diarrhea and vomiting.   Musculoskeletal: Positive for right shoulder and neck pain but negative for back pain and falls.   Skin:  Negative for rash.   Neurological:  Negative for dizziness, seizures, weakness and headaches.   Endo/Heme/Allergies:  Does not bruise/bleed easily.   Psychiatric/Behavioral:  Negative for depression, anxiety and suicidal thoughts.      Objective:     Exam: /68 (BP Location: Left arm, Patient Position: Sitting, BP Cuff Size: Adult)   Pulse 69   Temp 37.2 °C (98.9 °F) (Temporal)   Ht 1.499 m (4' 11\")   Wt 59 kg (130 lb)   SpO2 100%  Body mass index is 26.26 kg/m².    Gen: Alert and oriented, no acute distress.  Eyes:  PERRL, conjunctivae clear, lids normal.   ENMT: Lips without lesions, good dentition, moist mucous membranes.  Tympanic membranes translucent with good landmarks today bilaterally.  No oropharyngeal erythema or exudate.  Neck: Neck is supple, trachea middle, no thyromegaly.  Lungs: Normal effort, CTAB, no wheezing / rhonchi / rales.  CV: RRR, normal S1 and S2, no murmurs.  GI:  Abdomen soft, non-tender, non-distended with normal bowel sounds.  Ext: No clubbing, cyanosis, or edema.  Skin:  Warm and dry with no rashes or lesions.  Neuro: AAO x 3, no acute focal deficits.  Psych: Normal affect and mood.      Assessment & Plan:   33 y.o. " female with the following -    1. Encounter to establish care  2. Routine health maintenance  Patient presents today to establish care.  Chart was reviewed and history was discussed in detail with the patient.  Previous labs reviewed and annual labs ordered.  Patient [facility] and she will return next year for Pap smear.  UTD with vaccines.  - CBC WITH DIFFERENTIAL; Future  - Comp Metabolic Panel; Future    3. Chronic right shoulder pain  Chronic, uncontrolled.  Follows up with sports medicine.  Given phone # for PT and patient will schedule appointment.    4. Seasonal allergies  Chronic, uncontrolled.  Patient has tried several OTC antihistamines and nasal sprays but reports no improvement.  Patient also occasionally uses albuterol inhaler.  Given referral to allergist.  - Referral to Allergy    5. Exercise-induced asthma  Chronic, controlled.  Continue albuterol as needed.    6. Chronic neck pain  Chronic, uncontrolled.  No improvement with methocarbamol or naproxen.  Patient was advised to speak with Dr. Carreno.    7. IUD (intrauterine device) in place  Patient thinks she may have Kyleena and it was inserted in March or April 2019.  Patient will go home and check her card.  Patient will return next year for IUD with Danae if due.    8. Elevated LDL cholesterol level  Chronic, uncontrolled.  July 2021 LDL was slightly elevated at 106.  Repeat lipid panel ordered.  - Lipid Profile; Future    9. Family history of thyroid disease in mother  - TSH WITH REFLEX TO FT4; Future    10. Need for hepatitis C screening test  - HEP C VIRUS ANTIBODY; Future          I spent a total of 35 minutes with record review, exam, communication with the patient, communication with other providers, and documentation of this encounter.    Return in about 1 year (around 8/4/2024) for Annual preventive visit, Gyn exam.    Please note that this dictation was created using voice recognition software. I have  made every reasonable attempt to correct obvious errors, but I expect that there are errors of grammar and possibly content that I did not discover before finalizing the note.

## 2023-08-04 NOTE — ASSESSMENT & PLAN NOTE
Patient reports no improvement with levocetirizine, cetirizine, fexofenadine, fluticasone nasal spray, ipratropium nasal spray.  Patient occasionally uses albuterol inhaler.  Patient reports history of frequent sinus infections.  Patient is requesting referral to Allergist.

## 2023-08-04 NOTE — ASSESSMENT & PLAN NOTE
Patient complains of chronic right shoulder pain that was recently exacerbated.  Follows up with sports medicine and PT ordered.  X-ray and MRI were unremarkable.

## 2023-08-04 NOTE — ASSESSMENT & PLAN NOTE
Onset in 2017.  Patient was in training for the [employer] and onset when she was doing a sit up.  Patient described it as sharp, pinching pain.  Patient reports she can no longer do sit-up.  [facility] given [facility]ocarbamol and naproxen but reports no improvement.  Patient tried massage therapy and orthopedic pillow but no improvement.

## 2024-06-21 NOTE — PROGRESS NOTES
Advance Care Planning     Advance Care Planning opens a door to talk about and write down your wishes before a sudden accident or illness.  Make your goals, values, and preferences known.     This puts you in the ’s seat and helps others know what matters most to you so they won’t have to guess.      Where can you learn more?    Go to [email or web address]   to learn how to:    Name someone you trust to make healthcare decisions for you, only if you can’t. (Healthcare Power of )    Document your wishes for care if you were seriously ill and not expected to recover or are approaching end of life. (Advance Directive or Living Will)    The same page can be found using the QR code below.                Starting a Weight Loss Plan: Care Instructions  Overview     It can be a challenge to lose weight. But your doctor can help you make a weight-loss plan that meets your needs.  You don't have to make a lot of big changes at once. A better idea might be to focus on small changes and stick with them. When those changes become habit, you can add a few more changes.  Some people find it helpful to take an exercise or nutrition class. If you have questions, ask your doctor about seeing a registered dietitian or an exercise specialist. You might also think about joining a weight-loss support group.  If you're not ready to make changes right now, try to pick a date in the future. Then make an appointment with your doctor to talk about when and how you'll get started with a plan.  Follow-up care is a key part of your treatment and safety. Be sure to make and go to all appointments, and call your doctor if you are having problems. It's also a good idea to know your test results and keep a list of the medicines you take.  How can you care for yourself at home?  Set realistic goals. Many people expect to lose much more weight than is likely. A weight loss of 5% to 10% of your body  CC:    Chief Complaint   Patient presents with   • Establish Care       HISTORY OF THE PRESENT ILLNESS: Patient is a 31 y.o. female presenting to [facility] primary care     1. Pt taking OTC primatene mist inhaler for her asthma. Since moving to [facility] she has been more reliant on it. Wondering if there is something more that she can take. Has been using her inhaler twice a day.     No problem-specific Assessment & Plan notes found for this encounter.    Allergies: Patient has no known allergies.    Current Outpatient Medications Ordered in Epic   Medication Sig Dispense Refill   • ALBUTEROL INH Inhale.     • EPINEPHrine (PRIMATENE MIST INH) Inhale.       No current [facility]-ordered facility-administered medications on file.       Past Medical History:   Diagnosis Date   • Allergy    • Asthma        Past Surgical History:   Procedure Laterality Date   • PRIMARY C SECTION      2       Social History     Tobacco Use   • Smoking status: Never Smoker   • Smokeless tobacco: Never Used   Vaping Use   • Vaping Use: Never used   Substance Use Topics   • Alcohol use: Yes     Comment: rarely   • Drug use: Never       Social History     Social History Narrative   • Not on file       No family history on file.    ROS:     - Constitutional: Negative for fever, chills, unexpected weight change, and fatigue/generalized weakness.     - HEENT: Negative for headaches, vision changes, hearing changes, ear pain, ear discharge, rhinorrhea, sinus congestion, sore throat, and neck pain.      - Respiratory: Positive for SOB.  Negative for cough, sputum production, chest congestion, dyspnea, wheezing, and crackles.      - Cardiovascular: Negative for chest pain, palpitations, orthopnea, and bilateral lower extremity edema.     - Gastrointestinal: Negative for heartburn, nausea, vomiting, abdominal pain, hematochezia, melena, diarrhea, constipation, and greasy/foul-smelling stools.     - Genitourinary: Negative for dysuria, polyuria,  "hematuria, pyuria, urinary urgency, and urinary incontinence.     - Musculoskeletal: Negative for myalgias, back pain, and joint pain.     - Skin: Negative for rash, itching, cyanotic skin color change.     - Neurological:Positive for occasional headaches.  Negative for dizziness, tingling, tremors, focal sensory deficit, focal weakness and headaches.     - Endo/Heme/Allergies: Does not bruise/bleed easily.     - Psychiatric/Behavioral: Negative for depression, suicidal/homicidal ideation and memory loss.            .      Exam: /68   Pulse 75   Temp 36.2 °C (97.1 °F) (Temporal)   Resp 12   Ht 1.499 m (4' 11\")   Wt 56.3 kg (124 lb 3.2 oz)   SpO2 97%  Body mass index is 25.09 kg/m².    General: Normal appearing. No acute distress.  Skin: Warm and dry.  No obvious lesions.  HEENT: Normocephalic. Eyes conjunctiva clear lids without ptosis, ears normal shape and contour. No lymphadenopathy. Positive for mild thyromegaly.   Cardiovascular: Regular rate and rhythm without murmur.   Respiratory: Clear to auscultation bilaterally, no rhonchi wheezing or rales.  Neurologic: Grossly nonfocal, A&O x3, gait normal,  Musculoskeletal: No deformity or swelling.   Extremities: No extremity cyanosis, clubbing, or edema.  Psych: Normal mood and affect. Judgment and insight is normal.    Please note that this dictation was created using voice recognition software. I have made every reasonable attempt to correct obvious errors, but I expect that there are errors of grammar and possibly content that I did not discover before finalizing the note.      Assessment/Plan  1. Physical exam, annual  PE conducted. Will check labs and review at next visit  - CBC WITH DIFFERENTIAL; Future  - Comp Metabolic Panel; Future  - Lipid Profile; Future  - HEMOGLOBIN A1C; Future  - TSH WITH REFLEX TO FT4; Future    2. Thyromegaly  Will get US and review at next visit  - US-THYROID; Future    3. Moderate persistent asthma without " complication  Will trial on flovent and albuterol and review at next visit.   - fluticasone (FLOVENT HFA) 110 MCG/ACT Aerosol; Inhale 1 Puff 2 times a day for 30 days.  Dispense: 1 Each; Refill: 0  - albuterol 108 (90 Base) MCG/ACT Aero Soln inhalation aerosol; Inhale 2 Puffs every 6 hours as needed for Shortness of Breath for up to 30 days.  Dispense: 1 Each; Refill: 0

## 2024-10-09 ENCOUNTER — HOSPITAL ENCOUNTER (OUTPATIENT)
Facility: MEDICAL CENTER | Age: 34
End: 2024-10-09
Attending: STUDENT IN AN ORGANIZED HEALTH CARE EDUCATION/TRAINING PROGRAM
Payer: OTHER GOVERNMENT

## 2024-10-09 ENCOUNTER — APPOINTMENT (OUTPATIENT)
Dept: MEDICAL GROUP | Facility: PHYSICIAN GROUP | Age: 34
End: 2024-10-09
Payer: OTHER GOVERNMENT

## 2024-10-09 VITALS
BODY MASS INDEX: 26.23 KG/M2 | WEIGHT: 130.1 LBS | TEMPERATURE: 97 F | HEART RATE: 99 BPM | OXYGEN SATURATION: 100 % | SYSTOLIC BLOOD PRESSURE: 102 MMHG | DIASTOLIC BLOOD PRESSURE: 56 MMHG | HEIGHT: 59 IN

## 2024-10-09 DIAGNOSIS — M25.511 CHRONIC RIGHT SHOULDER PAIN: ICD-10-CM

## 2024-10-09 DIAGNOSIS — G89.29 CHRONIC RIGHT SHOULDER PAIN: ICD-10-CM

## 2024-10-09 DIAGNOSIS — Z01.419 WELL WOMAN EXAM: ICD-10-CM

## 2024-10-09 DIAGNOSIS — Z97.5 IUD (INTRAUTERINE DEVICE) IN PLACE: ICD-10-CM

## 2024-10-09 DIAGNOSIS — J45.990 EXERCISE-INDUCED ASTHMA: ICD-10-CM

## 2024-10-09 DIAGNOSIS — M54.2 CHRONIC NECK PAIN: ICD-10-CM

## 2024-10-09 DIAGNOSIS — J30.2 SEASONAL ALLERGIES: ICD-10-CM

## 2024-10-09 DIAGNOSIS — E78.00 ELEVATED LDL CHOLESTEROL LEVEL: ICD-10-CM

## 2024-10-09 DIAGNOSIS — G89.29 CHRONIC NECK PAIN: ICD-10-CM

## 2024-10-09 DIAGNOSIS — Z91.030 BEE STING ALLERGY: ICD-10-CM

## 2024-10-09 DIAGNOSIS — Z23 NEED FOR VACCINATION: ICD-10-CM

## 2024-10-09 DIAGNOSIS — N64.4 BREAST PAIN, LEFT: ICD-10-CM

## 2024-10-09 PROCEDURE — 90656 IIV3 VACC NO PRSV 0.5 ML IM: CPT | Performed by: STUDENT IN AN ORGANIZED HEALTH CARE EDUCATION/TRAINING PROGRAM

## 2024-10-09 PROCEDURE — 3074F SYST BP LT 130 MM HG: CPT | Performed by: STUDENT IN AN ORGANIZED HEALTH CARE EDUCATION/TRAINING PROGRAM

## 2024-10-09 PROCEDURE — 87624 HPV HI-RISK TYP POOLED RSLT: CPT

## 2024-10-09 PROCEDURE — 99459 PELVIC EXAMINATION: CPT | Mod: 25 | Performed by: STUDENT IN AN ORGANIZED HEALTH CARE EDUCATION/TRAINING PROGRAM

## 2024-10-09 PROCEDURE — 99395 PREV VISIT EST AGE 18-39: CPT | Mod: 25 | Performed by: STUDENT IN AN ORGANIZED HEALTH CARE EDUCATION/TRAINING PROGRAM

## 2024-10-09 PROCEDURE — 90471 IMMUNIZATION ADMIN: CPT | Performed by: STUDENT IN AN ORGANIZED HEALTH CARE EDUCATION/TRAINING PROGRAM

## 2024-10-09 PROCEDURE — 99000 SPECIMEN HANDLING OFFICE-LAB: CPT | Performed by: STUDENT IN AN ORGANIZED HEALTH CARE EDUCATION/TRAINING PROGRAM

## 2024-10-09 PROCEDURE — 88175 CYTOPATH C/V AUTO FLUID REDO: CPT

## 2024-10-09 PROCEDURE — 99214 OFFICE O/P EST MOD 30 MIN: CPT | Mod: 25 | Performed by: STUDENT IN AN ORGANIZED HEALTH CARE EDUCATION/TRAINING PROGRAM

## 2024-10-09 PROCEDURE — 3078F DIAST BP <80 MM HG: CPT | Performed by: STUDENT IN AN ORGANIZED HEALTH CARE EDUCATION/TRAINING PROGRAM

## 2024-10-09 RX ORDER — EPINEPHRINE 0.3 MG/.3ML
0.3 INJECTION SUBCUTANEOUS ONCE
Qty: 2 EACH | Refills: 1 | Status: SHIPPED | OUTPATIENT
Start: 2024-10-09 | End: 2024-10-09

## 2024-10-09 SDOH — ECONOMIC STABILITY: FOOD INSECURITY: WITHIN THE PAST 12 MONTHS, THE FOOD YOU BOUGHT JUST DIDN'T LAST AND YOU DIDN'T HAVE MONEY TO GET MORE.: NEVER TRUE

## 2024-10-09 SDOH — ECONOMIC STABILITY: INCOME INSECURITY: HOW HARD IS IT FOR YOU TO PAY FOR THE VERY BASICS LIKE FOOD, HOUSING, MEDICAL CARE, AND HEATING?: NOT HARD AT ALL

## 2024-10-09 SDOH — ECONOMIC STABILITY: FOOD INSECURITY: WITHIN THE PAST 12 MONTHS, YOU WORRIED THAT YOUR FOOD WOULD RUN OUT BEFORE YOU GOT MONEY TO BUY MORE.: NEVER TRUE

## 2024-10-09 SDOH — ECONOMIC STABILITY: TRANSPORTATION INSECURITY
IN THE PAST 12 MONTHS, HAS THE LACK OF TRANSPORTATION KEPT YOU FROM MEDICAL APPOINTMENTS OR FROM GETTING MEDICATIONS?: NO

## 2024-10-09 SDOH — ECONOMIC STABILITY: HOUSING INSECURITY
IN THE LAST 12 MONTHS, WAS THERE A TIME WHEN YOU DID NOT HAVE A STEADY PLACE TO SLEEP OR SLEPT IN A SHELTER (INCLUDING NOW)?: NO

## 2024-10-09 SDOH — ECONOMIC STABILITY: TRANSPORTATION INSECURITY
IN THE PAST 12 MONTHS, HAS LACK OF RELIABLE TRANSPORTATION KEPT YOU FROM MEDICAL APPOINTMENTS, MEETINGS, WORK OR FROM GETTING THINGS NEEDED FOR DAILY LIVING?: NO

## 2024-10-09 SDOH — HEALTH STABILITY: PHYSICAL HEALTH: ON AVERAGE, HOW MANY DAYS PER WEEK DO YOU ENGAGE IN MODERATE TO STRENUOUS EXERCISE (LIKE A BRISK WALK)?: 5 DAYS

## 2024-10-09 SDOH — HEALTH STABILITY: PHYSICAL HEALTH: ON AVERAGE, HOW MANY MINUTES DO YOU ENGAGE IN EXERCISE AT THIS LEVEL?: 60 MIN

## 2024-10-09 SDOH — HEALTH STABILITY: MENTAL HEALTH
STRESS IS WHEN SOMEONE FEELS TENSE, NERVOUS, ANXIOUS, OR CAN'T SLEEP AT NIGHT BECAUSE THEIR MIND IS TROUBLED. HOW STRESSED ARE YOU?: NOT AT ALL

## 2024-10-09 SDOH — ECONOMIC STABILITY: INCOME INSECURITY: IN THE LAST 12 MONTHS, WAS THERE A TIME WHEN YOU WERE NOT ABLE TO PAY THE MORTGAGE OR RENT ON TIME?: NO

## 2024-10-09 SDOH — ECONOMIC STABILITY: TRANSPORTATION INSECURITY
IN THE PAST 12 MONTHS, HAS LACK OF TRANSPORTATION KEPT YOU FROM MEETINGS, WORK, OR FROM GETTING THINGS NEEDED FOR DAILY LIVING?: NO

## 2024-10-09 ASSESSMENT — SOCIAL DETERMINANTS OF HEALTH (SDOH)
WITHIN THE PAST 12 MONTHS, YOU WORRIED THAT YOUR FOOD WOULD RUN OUT BEFORE YOU GOT THE MONEY TO BUY MORE: NEVER TRUE
IN THE PAST 12 MONTHS, HAS THE ELECTRIC, GAS, OIL, OR WATER COMPANY THREATENED TO SHUT OFF SERVICE IN YOUR HOME?: NO
HOW OFTEN DO YOU ATTENT MEETINGS OF THE CLUB OR ORGANIZATION YOU BELONG TO?: MORE THAN 4 TIMES PER YEAR
DO YOU BELONG TO ANY CLUBS OR ORGANIZATIONS SUCH AS CHURCH GROUPS UNIONS, FRATERNAL OR ATHLETIC GROUPS, OR SCHOOL GROUPS?: YES
HOW OFTEN DO YOU GET TOGETHER WITH FRIENDS OR RELATIVES?: ONCE A WEEK
HOW OFTEN DO YOU ATTENT MEETINGS OF THE CLUB OR ORGANIZATION YOU BELONG TO?: MORE THAN 4 TIMES PER YEAR
ARE YOU MARRIED, WIDOWED, DIVORCED, SEPARATED, NEVER MARRIED, OR LIVING WITH A PARTNER?: LIVING WITH PARTNER
IN A TYPICAL WEEK, HOW MANY TIMES DO YOU TALK ON THE PHONE WITH FAMILY, FRIENDS, OR NEIGHBORS?: TWICE A WEEK
HOW HARD IS IT FOR YOU TO PAY FOR THE VERY BASICS LIKE FOOD, HOUSING, MEDICAL CARE, AND HEATING?: NOT HARD AT ALL
DO YOU BELONG TO ANY CLUBS OR ORGANIZATIONS SUCH AS CHURCH GROUPS UNIONS, FRATERNAL OR ATHLETIC GROUPS, OR SCHOOL GROUPS?: YES
HOW OFTEN DO YOU GET TOGETHER WITH FRIENDS OR RELATIVES?: ONCE A WEEK
HOW OFTEN DO YOU ATTEND CHURCH OR RELIGIOUS SERVICES?: NEVER
HOW OFTEN DO YOU HAVE A DRINK CONTAINING ALCOHOL: MONTHLY OR LESS
ARE YOU MARRIED, WIDOWED, DIVORCED, SEPARATED, NEVER MARRIED, OR LIVING WITH A PARTNER?: LIVING WITH PARTNER
IN A TYPICAL WEEK, HOW MANY TIMES DO YOU TALK ON THE PHONE WITH FAMILY, FRIENDS, OR NEIGHBORS?: TWICE A WEEK
HOW OFTEN DO YOU ATTEND CHURCH OR RELIGIOUS SERVICES?: NEVER
HOW MANY DRINKS CONTAINING ALCOHOL DO YOU HAVE ON A TYPICAL DAY WHEN YOU ARE DRINKING: 1 OR 2
HOW OFTEN DO YOU HAVE SIX OR MORE DRINKS ON ONE OCCASION: NEVER

## 2024-10-09 ASSESSMENT — LIFESTYLE VARIABLES
HOW OFTEN DO YOU HAVE A DRINK CONTAINING ALCOHOL: MONTHLY OR LESS
HOW OFTEN DO YOU HAVE SIX OR MORE DRINKS ON ONE OCCASION: NEVER
HOW MANY STANDARD DRINKS CONTAINING ALCOHOL DO YOU HAVE ON A TYPICAL DAY: 1 OR 2
AUDIT-C TOTAL SCORE: 1
SKIP TO QUESTIONS 9-10: 1

## 2024-10-09 ASSESSMENT — PATIENT HEALTH QUESTIONNAIRE - PHQ9: CLINICAL INTERPRETATION OF PHQ2 SCORE: 0

## 2024-10-14 LAB
CYTOLOGIST CVX/VAG CYTO: NORMAL
CYTOLOGY CVX/VAG DOC CYTO: NORMAL
CYTOLOGY CVX/VAG DOC THIN PREP: NORMAL
HPV I/H RISK 4 DNA CVX QL PROBE+SIG AMP: NEGATIVE
NOTE NL11727A: NORMAL
OTHER STN SPEC: NORMAL
QC REVIEWED BY NL11722A: NORMAL
STAT OF ADQ CVX/VAG CYTO-IMP: NORMAL

## 2024-10-21 ENCOUNTER — OFFICE VISIT (OUTPATIENT)
Dept: PHYSICAL MEDICINE AND REHAB | Facility: MEDICAL CENTER | Age: 34
End: 2024-10-21
Payer: OTHER GOVERNMENT

## 2024-10-21 VITALS
HEART RATE: 74 BPM | BODY MASS INDEX: 26.29 KG/M2 | TEMPERATURE: 97.2 F | DIASTOLIC BLOOD PRESSURE: 78 MMHG | SYSTOLIC BLOOD PRESSURE: 110 MMHG | WEIGHT: 130.4 LBS | HEIGHT: 59 IN | OXYGEN SATURATION: 98 %

## 2024-10-21 DIAGNOSIS — M79.18 MYALGIA OF MUSCLE OF NECK: ICD-10-CM

## 2024-10-21 DIAGNOSIS — M79.18 CERVICAL MYOFASCIAL PAIN SYNDROME: ICD-10-CM

## 2024-10-21 DIAGNOSIS — M54.2 CHRONIC NECK PAIN: ICD-10-CM

## 2024-10-21 DIAGNOSIS — G89.29 CHRONIC NECK PAIN: ICD-10-CM

## 2024-10-21 PROCEDURE — 3074F SYST BP LT 130 MM HG: CPT | Performed by: STUDENT IN AN ORGANIZED HEALTH CARE EDUCATION/TRAINING PROGRAM

## 2024-10-21 PROCEDURE — 20553 NJX 1/MLT TRIGGER POINTS 3/>: CPT | Performed by: STUDENT IN AN ORGANIZED HEALTH CARE EDUCATION/TRAINING PROGRAM

## 2024-10-21 PROCEDURE — 1125F AMNT PAIN NOTED PAIN PRSNT: CPT | Performed by: STUDENT IN AN ORGANIZED HEALTH CARE EDUCATION/TRAINING PROGRAM

## 2024-10-21 PROCEDURE — 99204 OFFICE O/P NEW MOD 45 MIN: CPT | Mod: 25 | Performed by: STUDENT IN AN ORGANIZED HEALTH CARE EDUCATION/TRAINING PROGRAM

## 2024-10-21 PROCEDURE — 3078F DIAST BP <80 MM HG: CPT | Performed by: STUDENT IN AN ORGANIZED HEALTH CARE EDUCATION/TRAINING PROGRAM

## 2024-10-21 PROCEDURE — 76942 ECHO GUIDE FOR BIOPSY: CPT | Performed by: STUDENT IN AN ORGANIZED HEALTH CARE EDUCATION/TRAINING PROGRAM

## 2024-10-21 RX ADMIN — Medication 10 ML: at 10:04

## 2024-10-21 ASSESSMENT — PATIENT HEALTH QUESTIONNAIRE - PHQ9: CLINICAL INTERPRETATION OF PHQ2 SCORE: 0

## 2024-10-21 ASSESSMENT — PAIN SCALES - GENERAL: PAINLEVEL: 6=MODERATE PAIN

## 2024-10-24 ENCOUNTER — HOSPITAL ENCOUNTER (OUTPATIENT)
Dept: RADIOLOGY | Facility: MEDICAL CENTER | Age: 34
End: 2024-10-24
Attending: STUDENT IN AN ORGANIZED HEALTH CARE EDUCATION/TRAINING PROGRAM
Payer: OTHER GOVERNMENT

## 2024-10-24 DIAGNOSIS — N64.4 BREAST PAIN, LEFT: ICD-10-CM

## 2024-10-24 PROCEDURE — G0279 TOMOSYNTHESIS, MAMMO: HCPCS

## 2024-10-24 PROCEDURE — 76642 ULTRASOUND BREAST LIMITED: CPT | Mod: LT

## 2024-11-14 ENCOUNTER — HOSPITAL ENCOUNTER (OUTPATIENT)
Dept: RADIOLOGY | Facility: MEDICAL CENTER | Age: 34
End: 2024-11-14
Attending: STUDENT IN AN ORGANIZED HEALTH CARE EDUCATION/TRAINING PROGRAM
Payer: OTHER GOVERNMENT

## 2024-11-14 DIAGNOSIS — M79.18 MYALGIA OF MUSCLE OF NECK: ICD-10-CM

## 2024-11-14 DIAGNOSIS — M79.18 CERVICAL MYOFASCIAL PAIN SYNDROME: ICD-10-CM

## 2024-11-14 DIAGNOSIS — G89.29 CHRONIC NECK PAIN: ICD-10-CM

## 2024-11-14 DIAGNOSIS — M54.2 CHRONIC NECK PAIN: ICD-10-CM

## 2024-11-14 PROCEDURE — 72040 X-RAY EXAM NECK SPINE 2-3 VW: CPT

## 2024-12-16 ENCOUNTER — APPOINTMENT (OUTPATIENT)
Dept: PHYSICAL MEDICINE AND REHAB | Facility: MEDICAL CENTER | Age: 34
End: 2024-12-16
Payer: OTHER GOVERNMENT

## 2025-02-24 ENCOUNTER — APPOINTMENT (OUTPATIENT)
Dept: PHYSICAL THERAPY | Facility: REHABILITATION | Age: 35
End: 2025-02-24
Attending: STUDENT IN AN ORGANIZED HEALTH CARE EDUCATION/TRAINING PROGRAM
Payer: OTHER GOVERNMENT

## 2025-03-10 ENCOUNTER — APPOINTMENT (OUTPATIENT)
Dept: PHYSICAL THERAPY | Facility: REHABILITATION | Age: 35
End: 2025-03-10
Attending: STUDENT IN AN ORGANIZED HEALTH CARE EDUCATION/TRAINING PROGRAM
Payer: OTHER GOVERNMENT

## 2025-03-24 ENCOUNTER — APPOINTMENT (OUTPATIENT)
Dept: PHYSICAL THERAPY | Facility: REHABILITATION | Age: 35
End: 2025-03-24
Attending: STUDENT IN AN ORGANIZED HEALTH CARE EDUCATION/TRAINING PROGRAM
Payer: OTHER GOVERNMENT

## 2025-04-07 ENCOUNTER — APPOINTMENT (OUTPATIENT)
Dept: PHYSICAL THERAPY | Facility: REHABILITATION | Age: 35
End: 2025-04-07
Attending: STUDENT IN AN ORGANIZED HEALTH CARE EDUCATION/TRAINING PROGRAM
Payer: OTHER GOVERNMENT

## 2025-04-21 ENCOUNTER — APPOINTMENT (OUTPATIENT)
Dept: PHYSICAL THERAPY | Facility: REHABILITATION | Age: 35
End: 2025-04-21
Attending: STUDENT IN AN ORGANIZED HEALTH CARE EDUCATION/TRAINING PROGRAM
Payer: OTHER GOVERNMENT

## 2025-05-12 ENCOUNTER — APPOINTMENT (OUTPATIENT)
Dept: MEDICAL GROUP | Facility: PHYSICIAN GROUP | Age: 35
End: 2025-05-12
Payer: OTHER GOVERNMENT

## 2025-05-12 VITALS
BODY MASS INDEX: 26.49 KG/M2 | HEART RATE: 80 BPM | HEIGHT: 59 IN | WEIGHT: 131.4 LBS | DIASTOLIC BLOOD PRESSURE: 64 MMHG | OXYGEN SATURATION: 100 % | SYSTOLIC BLOOD PRESSURE: 98 MMHG | TEMPERATURE: 97.9 F

## 2025-05-12 DIAGNOSIS — Z00.00 ROUTINE HEALTH MAINTENANCE: ICD-10-CM

## 2025-05-12 DIAGNOSIS — B35.3 TINEA PEDIS OF LEFT FOOT: ICD-10-CM

## 2025-05-12 DIAGNOSIS — F98.8 ATTENTION DEFICIT DISORDER, UNSPECIFIED TYPE: ICD-10-CM

## 2025-05-12 DIAGNOSIS — E78.00 ELEVATED LDL CHOLESTEROL LEVEL: ICD-10-CM

## 2025-05-12 PROCEDURE — 3078F DIAST BP <80 MM HG: CPT | Performed by: STUDENT IN AN ORGANIZED HEALTH CARE EDUCATION/TRAINING PROGRAM

## 2025-05-12 PROCEDURE — 99214 OFFICE O/P EST MOD 30 MIN: CPT | Performed by: STUDENT IN AN ORGANIZED HEALTH CARE EDUCATION/TRAINING PROGRAM

## 2025-05-12 PROCEDURE — 3074F SYST BP LT 130 MM HG: CPT | Performed by: STUDENT IN AN ORGANIZED HEALTH CARE EDUCATION/TRAINING PROGRAM

## 2025-05-12 RX ORDER — FLUCONAZOLE 150 MG/1
150 TABLET ORAL
Qty: 4 TABLET | Refills: 0 | Status: SHIPPED | OUTPATIENT
Start: 2025-05-12 | End: 2025-06-03

## 2025-05-12 RX ORDER — DEXTROAMPHETAMINE SACCHARATE, AMPHETAMINE ASPARTATE, DEXTROAMPHETAMINE SULFATE AND AMPHETAMINE SULFATE 1.25; 1.25; 1.25; 1.25 MG/1; MG/1; MG/1; MG/1
5 TABLET ORAL DAILY
Qty: 30 TABLET | Refills: 0 | Status: SHIPPED | OUTPATIENT
Start: 2025-05-12 | End: 2025-06-11

## 2025-05-12 NOTE — PROGRESS NOTES
"Subjective:     Chief Complaint   Patient presents with    Referral Needed     [facility]hd evaluation ([facility])     Tinea Pedis     L foot using otc not working        History of Present Illness  The patient presents for evaluation of ADHD and athlete's foot.    She reports experiencing difficulties with focus and concentration, particularly in her professional environment. Her supervisor has observed a decline in her ability to complete tasks and an increase in distractibility. She also notes similar issues at home, although they are less pronounced. [facility]. During her first visit, she was diagnosed with PTSD. On her second visit, she was prescribed antidepressants for depression, which unfortunately did not alleviate her symptoms, and therapy was recommended but she declined. Despite these challenges, she managed to maintain satisfactory academic performance during her school years. She excelled in art classes and even pursued an art degree in college. 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 She works 5 days a week, extending to 7 days when on [employer] duty. She has not previously been prescribed medication specifically for ADHD.    She has been managing a case of athlete's foot, localized to her left foot, for several months. She has been using Lotrimin and cortisone as part of her treatment regimen for the past couple months without any noticeable improvement.        Health Maintenance: Completed    ROS:  Negative except as stated above.      Objective:     Exam:  BP 98/64   Pulse 80   Temp 36.6 °C (97.9 °F) (Temporal)   Ht 1.499 m (4' 11\")   Wt 59.6 kg (131 lb 6.4 oz)   SpO2 100%   BMI 26.54 kg/m²  Body mass index is 26.54 kg/m².    Physical Exam    Gen: Alert and oriented, no acute distress.  Lungs: Normal effort, CTAB, no wheezing / rhonchi / rales.  CV: RRR, " normal S1 and S2, no murmurs.      Assessment & Plan:     35 y.o. female with the following -     1. Attention deficit disorder, unspecified type  Chronic, uncontrolled.  Adult ADHD Self-Report Scale (ASRS-v1.1) completed today and symptoms seem consistent with ADD.  PDMP reviewed.  UDS ordered and CS agreement signed today.  We will try adderall 5 mg daily.  Side effects of medication were discussed.  - Controlled Substance Treatment Agreement  - amphetamine-dextroamphetamine (ADDERALL) 5 MG Tab; Take 1 Tablet by mouth every day for 30 days.  Dispense: 30 Tablet; Refill: 0  - URINE DRUG SCREEN; Future    2. Tinea pedis of left foot  Chronic, uncontrolled.  Onset a few months ago and no improvement with OTC antifungal or steroid cream.  Prescribed weekly diflucan for the next 4 weeks.  - fluconazole (DIFLUCAN) 150 MG tablet; Take 1 Tablet by mouth every 7 days for 4 doses.  Dispense: 4 Tablet; Refill: 0    3. Routine health maintenance  - CBC WITHOUT DIFFERENTIAL; Future  - Comp Metabolic Panel; Future  - Lipid Profile; Future    4. Elevated LDL cholesterol level  - Lipid Profile; Future        Return in 1 month (on 6/12/2025) for CS refill, Discuss medication, Discuss labs.    Verbal consent was acquired by the patient to use [employer] ambient listening note generation during this visit: Yes.    Please note that this dictation was created using voice recognition software. I have made every reasonable attempt to correct obvious errors, but I expect that there are errors of grammar and possibly content that I did not discover before finalizing the note.

## 2025-06-11 ENCOUNTER — APPOINTMENT (OUTPATIENT)
Dept: MEDICAL GROUP | Facility: PHYSICIAN GROUP | Age: 35
End: 2025-06-11
Payer: OTHER GOVERNMENT

## 2025-06-11 VITALS — HEIGHT: 59 IN | WEIGHT: 125 LBS | BODY MASS INDEX: 25.2 KG/M2

## 2025-06-11 DIAGNOSIS — E78.00 ELEVATED LDL CHOLESTEROL LEVEL: Primary | ICD-10-CM

## 2025-06-11 DIAGNOSIS — F98.8 ATTENTION DEFICIT DISORDER, UNSPECIFIED TYPE: ICD-10-CM

## 2025-06-11 PROCEDURE — 99214 OFFICE O/P EST MOD 30 MIN: CPT | Mod: 95 | Performed by: STUDENT IN AN ORGANIZED HEALTH CARE EDUCATION/TRAINING PROGRAM

## 2025-06-11 RX ORDER — DEXTROAMPHETAMINE SACCHARATE, AMPHETAMINE ASPARTATE MONOHYDRATE, DEXTROAMPHETAMINE SULFATE AND AMPHETAMINE SULFATE 1.25; 1.25; 1.25; 1.25 MG/1; MG/1; MG/1; MG/1
5 CAPSULE, EXTENDED RELEASE ORAL EVERY MORNING
Qty: 30 CAPSULE | Refills: 0 | Status: SHIPPED | OUTPATIENT
Start: 2025-06-11 | End: 2025-07-11

## 2025-06-11 ASSESSMENT — FIBROSIS 4 INDEX: FIB4 SCORE: 0.8

## 2025-06-11 NOTE — PROGRESS NOTES
"Virtual Visit: Established Patient   This visit was conducted via Teams using secure and encrypted videoconferencing technology.   The patient was in a private location outside of their home in the [facility].    The patient's identity was confirmed and verbal consent was obtained for this virtual visit.    Subjective:   CC:   Chief Complaint   Patient presents with    Medication Refill     History of Present Illness  The patient presents for a CS refill and to discuss a fungal infection on her feet.    She has been responding positively to her current medication regimen of adderall IR 5 mg daily, with the exception of experiencing a crash around lunchtime. She takes her medication at 6 AM and reports that the effects seem to wear off by midday. Additionally, she has noticed an increase in irritability, which she attributes to the medication, but she has been able to manage this side effect. Her friend has also noticed the medication is helping with focus. She is considering transitioning to an extended-release formulation.    She is currently undergoing training for the [employer] and has resumed to wearing boots, which has caused a return in the fungal infection after it resolved with 4 weeks of diflucan.      ROS   Negative except as stated above.      Current medicines (including changes today)  Current Medications[1]    Patient Active Problem List    Diagnosis Date Noted    Elevated LDL cholesterol level 06/11/2025    Chronic right shoulder pain 08/04/2023    Seasonal allergies 08/04/2023    Exercise-induced asthma 08/04/2023    Chronic neck pain 08/04/2023    IUD (intrauterine device) in place 08/04/2023        Objective:   Ht 1.499 m (4' 11\")   Wt 56.7 kg (125 lb)   BMI 25.25 kg/m²     Physical Exam:  Constitutional: Alert, no distress, well-groomed.  Skin: No rashes in visible areas.  Eye: Round. Conjunctiva clear, lids normal. No icterus.   ENMT: Lips pink without lesions, good dentition, " moist mucous membranes. Phonation normal.  Neck: No masses, no thyromegaly. Moves freely without pain.  Respiratory: Unlabored respiratory effort, no cough or audible wheeze  Psych: Alert and oriented x3, normal affect and mood.     Labs:  Hospital Outpatient Visit on 06/02/2025   Component Date Value Ref Range Status    Cholesterol,Tot 06/02/2025 188  100 - 199 mg/dL Final    Triglycerides 06/02/2025 73  0 - 149 mg/dL Final    HDL 06/02/2025 51  >=40 mg/dL Final    LDL 06/02/2025 122 (H)  <100 mg/dL Final    Sodium 06/02/2025 139  135 - 145 mmol/L Final    Potassium 06/02/2025 4.4  3.6 - 5.5 mmol/L Final    Chloride 06/02/2025 104  96 - 112 mmol/L Final    Co2 06/02/2025 22  20 - 33 mmol/L Final    Anion Gap 06/02/2025 13.0  7.0 - 16.0 Final    Glucose 06/02/2025 98  65 - 99 mg/dL Final    Bun 06/02/2025 15  8 - 22 mg/dL Final    Creatinine 06/02/2025 0.92  0.50 - 1.40 mg/dL Final    Calcium 06/02/2025 9.5  8.5 - 10.5 mg/dL Final    Correct Calcium 06/02/2025 9.0  8.5 - 10.5 mg/dL Final    AST(SGOT) 06/02/2025 21  12 - 45 U/L Final    ALT(SGPT) 06/02/2025 15  2 - 50 U/L Final    Alkaline Phosphatase 06/02/2025 47  30 - 99 U/L Final    Total Bilirubin 06/02/2025 0.5  0.1 - 1.5 mg/dL Final    Albumin 06/02/2025 4.6  3.2 - 4.9 g/dL Final    Total Protein 06/02/2025 7.6  6.0 - 8.2 g/dL Final    Globulin 06/02/2025 3.0  1.9 - 3.5 g/dL Final    A-G Ratio 06/02/2025 1.5  g/dL Final    WBC 06/02/2025 5.0  4.8 - 10.8 K/uL Final    RBC 06/02/2025 4.89  4.20 - 5.40 M/uL Final    Hemoglobin 06/02/2025 14.3  12.0 - 16.0 g/dL Final    Hematocrit 06/02/2025 42.4  37.0 - 47.0 % Final    MCV 06/02/2025 86.7  81.4 - 97.8 fL Final    MCH 06/02/2025 29.2  27.0 - 33.0 pg Final    MCHC 06/02/2025 33.7  32.2 - 35.5 g/dL Final    RDW 06/02/2025 37.9  35.9 - 50.0 fL Final    Platelet Count 06/02/2025 238  164 - 446 K/uL Final    MPV 06/02/2025 9.3  9.0 - 12.9 fL Final    Urine Amphetamine-Methamphetam 06/02/2025 PresumptivePOS  Cutoff 300  ng/mL Final    Comment: If the screen is PresumptivePos, then confirmation testing by mass  spectrometry will be added. Additional charges will apply.      Barbiturates 06/02/2025 Negative  Cutoff 200 ng/mL Final    Comment: Presumptive Negative by immunoassay.  Testing by mass spectrometry  is available on request.      Benzodiazepines 06/02/2025 Negative  Cutoff 200 ng/mL Final    Comment: Presumptive Negative by immunoassay.  Testing by mass spectrometry  is available on request.      Propoxyphene 06/02/2025 Negative  Cutoff 300 ng/mL Final    Comment: Presumptive Negative by immunoassay.  Testing by mass spectrometry  is available on request.      Cocaine Metabolite 06/02/2025 Negative  Cutoff 150 ng/mL Final    Comment: Presumptive Negative by immunoassay.  Testing by mass spectrometry  is available on request.      Methadone 06/02/2025 Negative  Cutoff 150 ng/mL Final    Comment: Presumptive Negative by immunoassay.  Testing by mass spectrometry  is available on request.      Codeine-Morphine 06/02/2025 Negative  Cutoff 300 ng/mL Final    Comment: Presumptive Negative by immunoassay.  Testing by mass spectrometry  is available on request.      Phencyclidine -Pcp 06/02/2025 Negative  Cutoff 25 ng/mL Final    Comment: Presumptive Negative by immunoassay.  Testing by mass spectrometry  is available on request.      Cannabinoid Metab 06/02/2025 Negative  Cutoff 50 ng/mL Final    Comment: Presumptive Negative by immunoassay.  Testing by mass spectrometry  is available on request.      Creatinine Urine 06/02/2025 254.3  20.0 - 400.0 mg/dL Final    Drug Comment Urine Drugs 06/02/2025 See Note   Final    Comment: INTERPRETIVE INFORMATION: Drug Panel 9, Urn, Scrn w/Rflx to Conf  The absence of expected drug(s) and/or drug metabolite(s) may  indicate non-compliance, inappropriate timing of specimen  collection relative to drug administration, poor drug absorption,  diluted/adulterated urine, or limitations of testing.  The  concentration at which the screening test can detect a drug or  metabolite varies within a drug class. Specimens for which drugs  or drug classes are detected by the screen are reflexed to a  second, more specific technology (GC/MS and/or LC-MS/MS). The  concentration value must be greater than or equal to the cutoff to  be reported as positive. Interpretive questions should be directed  to the laboratory.  Oxycodone results are reported with the opiates results. MDMA  results are reported with the amphetamines results. The following  opioids are not detected in this test: fentanyl, buprenorphine,  meperidine, tramadol, and tapentadol. A comprehensive panel that  includes these                            opioids is available or individual opioid testing  can be ordered. Refer to [facility] for test information.  For medical purposes only; not valid for forensic use.  Performed By: [facility]  [address] [place] [id number]  : Eder Ferguson MD, PhD  CLIA Number: [id number]      GFR (CKD-EPI) 06/02/2025 83  >60 mL/min/1.73 m 2 Final    Comment: Estimated Glomerular Filtration Rate is calculated using  race neutral CKD-EPI 2021 equation per NKF-ASN recommendations.      Amphetamine, Urine 06/02/2025 4463  ng/mL Final    Comment: INTERPRETIVE INFORMATION: Amphetamines, Urine,  Quantitative  Methodology: Quantitative Liquid Chromatography-Tandem Mass  Spectrometry  Positive cutoff: 200 ng/mL unless specified below:  Amphetamine     50 ng/mL  For medical purposes only; not valid for forensic use.  The absence of expected drug(s) and/or drug metabolite(s) may  indicate non-compliance, inappropriate timing of specimen  collection relative to drug administration, poor drug absorption,  diluted/adulterated urine, or limitations of testing. The  concentration value must be greater than or equal to the cutoff to  be reported as positive. Interpretive questions should be  directed  to the laboratory.  This test was developed and its performance characteristics  determined by [facility]. It has not been cleared or  approved by the [place] Food and Drug Administration. This test was  performed in a CLIA certified laboratory and is intended for  clinical purposes.      Methamphetamine, Urine 06/02/2025 <200  ng/mL Final    Methylenedioxyamphetamine,Ur 06/02/2025 <200  ng/mL Final    Methylenedioxymethamphetamine,Ur 06/02/2025 <200  ng/mL Final    Methylenedioxyethylamphetamine,Ur 06/02/2025 <200  ng/mL Final    Phentermine, Ur, Qnt 06/02/2025 <200  ng/mL Final    Comment: Performed By: [facility]  [address] [place] [id number]  : Eder Ferguson MD, PhD  CLIA Number: [id number]           Assessment and Plan:   The following treatment plan was discussed:     1. Attention deficit disorder, unspecified type  Chronic, uncontrolled.  PDMP reviewed.  UDS ordered and CS agreement done May 2025.  Around lunchtime she feels that Adderall IR starts to wear off and prescription was changed to XR 5 mg daily.  - amphetamine-dextroamphetamine (ADDERALL XR) 5 MG XR capsule; Take 1 Capsule by mouth every morning for 30 days.  Dispense: 30 Capsule; Refill: 0    2. Elevated LDL cholesterol level (Primary)  Chronic, uncontrolled.   with normal TC, TG, HDL.  Statin not indicated at her age.  Advised to limit saturated/trans fat in diet.      Follow-up: Return in about 1 month (around 7/11/2025) for CS refill.              [1]   Current Outpatient Medications   Medication Sig Dispense Refill    amphetamine-dextroamphetamine (ADDERALL XR) 5 MG XR capsule Take 1 Capsule by mouth every morning for 30 days. 30 Capsule 0    methocarbamol (ROBAXIN) 500 MG Tab Take 1,000 mg by mouth 4 times a day.      naproxen (NAPROSYN) 500 MG Tab Take 500 mg by mouth 2 times a day with meals.      fluticasone (FLONASE) 50 MCG/ACT nasal spray Administer 1 Spray into affected  nostril(S) every day.      Cetirizine HCl 10 MG Cap Take  by mouth.      ipratropium (ATROVENT) 0.06 % Solution Administer 2 Sprays into affected nostril(S) 4 times a day.      fexofenadine (KP FEXOFENADINE HCL) 180 MG tablet Take 180 mg by mouth every day.      albuterol 108 (90 Base) MCG/ACT Aero Soln inhalation aerosol INHALE 1 PUFF BY MOUTH EVERY 6 HOURS AS NEEDED FOR WHEEZING.       No current facility-administered medications for this visit.